# Patient Record
Sex: FEMALE | Race: WHITE | NOT HISPANIC OR LATINO | URBAN - METROPOLITAN AREA
[De-identification: names, ages, dates, MRNs, and addresses within clinical notes are randomized per-mention and may not be internally consistent; named-entity substitution may affect disease eponyms.]

---

## 2020-11-23 ENCOUNTER — OFFICE VISIT (OUTPATIENT)
Dept: URBAN - METROPOLITAN AREA CLINIC 12 | Facility: CLINIC | Age: 50
End: 2020-11-23
Payer: COMMERCIAL

## 2020-11-23 ENCOUNTER — DASHBOARD ENCOUNTERS (OUTPATIENT)
Age: 50
End: 2020-11-23

## 2020-11-23 VITALS
SYSTOLIC BLOOD PRESSURE: 130 MMHG | DIASTOLIC BLOOD PRESSURE: 72 MMHG | TEMPERATURE: 97.1 F | BODY MASS INDEX: 21.58 KG/M2 | HEART RATE: 63 BPM | WEIGHT: 121.8 LBS | HEIGHT: 63 IN

## 2020-11-23 DIAGNOSIS — Z12.11 COLON CANCER SCREENING: ICD-10-CM

## 2020-11-23 PROCEDURE — G9903 PT SCRN TBCO ID AS NON USER: HCPCS | Performed by: STUDENT IN AN ORGANIZED HEALTH CARE EDUCATION/TRAINING PROGRAM

## 2020-11-23 PROCEDURE — 3017F COLORECTAL CA SCREEN DOC REV: CPT | Performed by: STUDENT IN AN ORGANIZED HEALTH CARE EDUCATION/TRAINING PROGRAM

## 2020-11-23 PROCEDURE — G8482 FLU IMMUNIZE ORDER/ADMIN: HCPCS | Performed by: STUDENT IN AN ORGANIZED HEALTH CARE EDUCATION/TRAINING PROGRAM

## 2020-11-23 PROCEDURE — 99202 OFFICE O/P NEW SF 15 MIN: CPT | Performed by: STUDENT IN AN ORGANIZED HEALTH CARE EDUCATION/TRAINING PROGRAM

## 2020-11-23 PROCEDURE — G8420 CALC BMI NORM PARAMETERS: HCPCS | Performed by: STUDENT IN AN ORGANIZED HEALTH CARE EDUCATION/TRAINING PROGRAM

## 2020-11-23 RX ORDER — BISACODYL 5 MG/1
AS DIRECTED TABLET, DELAYED RELEASE ORAL ONCE A DAY
Qty: 4 | Refills: 0 | OUTPATIENT
Start: 2020-11-23 | End: 2020-11-24

## 2020-11-23 RX ORDER — SODIUM, POTASSIUM,MAG SULFATES 17.5-3.13G
354 ML SOLUTION, RECONSTITUTED, ORAL ORAL
Qty: 354 ML | Refills: 0 | OUTPATIENT
Start: 2020-11-23 | End: 2020-11-24

## 2020-11-23 NOTE — HPI-OTHER HISTORIES
50F here for evaluation prior to initial colonoscopy.  Occasional diarrhea, and occasional constipation. Does not use medications for either diarrhea or constipation. No blood in the stool.  No prior colonoscopy No FHx of CRC. Occasionally takes ibuprofen for neck pain, but sporadically

## 2021-01-08 ENCOUNTER — OFFICE VISIT (OUTPATIENT)
Dept: URBAN - METROPOLITAN AREA SURGERY CENTER 15 | Facility: SURGERY CENTER | Age: 51
End: 2021-01-08
Payer: COMMERCIAL

## 2021-01-08 DIAGNOSIS — Z12.11 COLON CANCER SCREENING: ICD-10-CM

## 2021-01-08 PROCEDURE — G0121 COLON CA SCRN NOT HI RSK IND: HCPCS | Performed by: STUDENT IN AN ORGANIZED HEALTH CARE EDUCATION/TRAINING PROGRAM

## 2021-01-08 PROCEDURE — G8907 PT DOC NO EVENTS ON DISCHARG: HCPCS | Performed by: STUDENT IN AN ORGANIZED HEALTH CARE EDUCATION/TRAINING PROGRAM

## 2021-01-08 PROCEDURE — G9935 CANC NOT DETECTD DURING SRCN: HCPCS | Performed by: STUDENT IN AN ORGANIZED HEALTH CARE EDUCATION/TRAINING PROGRAM
